# Patient Record
Sex: MALE | Employment: OTHER | URBAN - METROPOLITAN AREA
[De-identification: names, ages, dates, MRNs, and addresses within clinical notes are randomized per-mention and may not be internally consistent; named-entity substitution may affect disease eponyms.]

---

## 2020-05-20 ENCOUNTER — OFFICE VISIT (OUTPATIENT)
Dept: PULMONOLOGY | Facility: CLINIC | Age: 70
End: 2020-05-20
Payer: MEDICARE

## 2020-05-20 VITALS
BODY MASS INDEX: 21.6 KG/M2 | DIASTOLIC BLOOD PRESSURE: 72 MMHG | HEART RATE: 83 BPM | OXYGEN SATURATION: 90 % | WEIGHT: 142.5 LBS | HEIGHT: 68 IN | SYSTOLIC BLOOD PRESSURE: 120 MMHG | RESPIRATION RATE: 20 BRPM

## 2020-05-20 DIAGNOSIS — J43.1 PANLOBULAR EMPHYSEMA: Primary | ICD-10-CM

## 2020-05-20 DIAGNOSIS — Z77.090 H/O ASBESTOS EXPOSURE: ICD-10-CM

## 2020-05-20 DIAGNOSIS — R09.02 EXERCISE HYPOXEMIA: ICD-10-CM

## 2020-05-20 DIAGNOSIS — Z77.090 ASBESTOS EXPOSURE: ICD-10-CM

## 2020-05-20 DIAGNOSIS — R06.09 DYSPNEA ON EXERTION: ICD-10-CM

## 2020-05-20 DIAGNOSIS — J44.89 ASTHMA WITH COPD: ICD-10-CM

## 2020-05-20 DIAGNOSIS — J44.1 COPD EXACERBATION: ICD-10-CM

## 2020-05-20 PROCEDURE — 99205 OFFICE O/P NEW HI 60 MIN: CPT | Mod: S$GLB,,, | Performed by: INTERNAL MEDICINE

## 2020-05-20 PROCEDURE — 99999 PR PBB SHADOW E&M-NEW PATIENT-LVL IV: ICD-10-PCS | Mod: PBBFAC,,, | Performed by: INTERNAL MEDICINE

## 2020-05-20 PROCEDURE — 99205 PR OFFICE/OUTPT VISIT, NEW, LEVL V, 60-74 MIN: ICD-10-PCS | Mod: S$GLB,,, | Performed by: INTERNAL MEDICINE

## 2020-05-20 PROCEDURE — 1101F PR PT FALLS ASSESS DOC 0-1 FALLS W/OUT INJ PAST YR: ICD-10-PCS | Mod: CPTII,S$GLB,, | Performed by: INTERNAL MEDICINE

## 2020-05-20 PROCEDURE — 1159F PR MEDICATION LIST DOCUMENTED IN MEDICAL RECORD: ICD-10-PCS | Mod: S$GLB,,, | Performed by: INTERNAL MEDICINE

## 2020-05-20 PROCEDURE — 1101F PT FALLS ASSESS-DOCD LE1/YR: CPT | Mod: CPTII,S$GLB,, | Performed by: INTERNAL MEDICINE

## 2020-05-20 PROCEDURE — 99999 PR PBB SHADOW E&M-NEW PATIENT-LVL IV: CPT | Mod: PBBFAC,,, | Performed by: INTERNAL MEDICINE

## 2020-05-20 PROCEDURE — 1159F MED LIST DOCD IN RCRD: CPT | Mod: S$GLB,,, | Performed by: INTERNAL MEDICINE

## 2020-05-20 RX ORDER — IPRATROPIUM BROMIDE AND ALBUTEROL SULFATE 2.5; .5 MG/3ML; MG/3ML
SOLUTION RESPIRATORY (INHALATION)
COMMUNITY
Start: 2020-05-18 | End: 2020-05-20

## 2020-05-20 RX ORDER — FLUTICASONE PROPIONATE AND SALMETEROL 250; 50 UG/1; UG/1
1 POWDER RESPIRATORY (INHALATION) 2 TIMES DAILY
Qty: 1 EACH | Refills: 11 | Status: SHIPPED | OUTPATIENT
Start: 2020-05-20 | End: 2020-07-24

## 2020-05-20 RX ORDER — GUAIFENESIN 600 MG/1
1200 TABLET, EXTENDED RELEASE ORAL 2 TIMES DAILY
Qty: 60 TABLET | Refills: 11 | Status: SHIPPED | OUTPATIENT
Start: 2020-05-20 | End: 2020-06-19

## 2020-05-20 RX ORDER — PREDNISONE 20 MG/1
TABLET ORAL
Qty: 20 TABLET | Refills: 1 | Status: SHIPPED | OUTPATIENT
Start: 2020-05-20

## 2020-05-20 RX ORDER — AZITHROMYCIN 250 MG/1
250 TABLET, FILM COATED ORAL DAILY
Qty: 6 TABLET | Refills: 1 | Status: SHIPPED | OUTPATIENT
Start: 2020-05-20

## 2020-05-20 RX ORDER — IPRATROPIUM BROMIDE AND ALBUTEROL SULFATE 2.5; .5 MG/3ML; MG/3ML
3 SOLUTION RESPIRATORY (INHALATION)
Qty: 120 VIAL | Refills: 11 | Status: SHIPPED | OUTPATIENT
Start: 2020-05-20

## 2020-05-20 NOTE — PROGRESS NOTES
Subjective:     Patient ID: Александр Roman is a 69 y.o. male.    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 The patient is not currently have symptoms / an exacerbation. The patient has COPD for approximately 1 years. Symptoms in previous episodes have included dyspnea, cough and wheezing, and typically last 2 weeks. Previous episodes have been exacerbated by strenuous activity. Current treatment includes albuterol inhaler, which generally provides some relief of symptoms.   He uses 1 pillows at night. Patient currently is not on home oxygen therapy.. The patient is having no constitutional symptoms, denying fever, chills, anorexia, or weight loss. The patient has not been hospitalized for this condition before. He has a history of 10 pack years. The patient has no exercise limitations.    Past Medical History:   Diagnosis Date    Arthritis     Emphysema of lung     Hypertension      Past Surgical History:   Procedure Laterality Date    HIP SURGERY Left     KNEE ARTHROSCOPY Left      Review of patient's allergies indicates:  No Known Allergies  Current Outpatient Medications on File Prior to Visit   Medication Sig Dispense Refill    albuterol 90 mcg/actuation inhaler Inhale 2 puffs into the lungs every 6 (six) hours. 1 Inhaler 12    hydrochlorothiazide (MICROZIDE) 12.5 mg capsule Take 12.5 mg by mouth once daily.      meloxicam (MOBIC) 15 MG tablet Take 15 mg by mouth once daily.      oxycodone-acetaminophen (PERCOCET)  mg per tablet Take 1 tablet by mouth every 6 (six) hours  as needed for Pain.      [DISCONTINUED] albuterol-ipratropium (DUO-NEB) 2.5 mg-0.5 mg/3 mL nebulizer solution        No current facility-administered medications on file prior to visit.      Social History     Socioeconomic History    Marital status: Single     Spouse name: Not on file    Number of children: Not on file    Years of education: Not on file    Highest education level: Not on file   Occupational History    Not on file   Social Needs    Financial resource strain: Not on file    Food insecurity:     Worry: Not on file     Inability: Not on file    Transportation needs:     Medical: Not on file     Non-medical: Not on file   Tobacco Use    Smoking status: Former Smoker    Smokeless tobacco: Former User     Quit date: 4/1/2014   Substance and Sexual Activity    Alcohol use: Never     Frequency: Never    Drug use: Not on file    Sexual activity: Not on file   Lifestyle    Physical activity:     Days per week: Not on file     Minutes per session: Not on file    Stress: Not on file   Relationships    Social connections:     Talks on phone: Not on file     Gets together: Not on file     Attends Shinto service: Not on file     Active member of club or organization: Not on file     Attends meetings of clubs or organizations: Not on file     Relationship status: Not on file   Other Topics Concern    Not on file   Social History Narrative    Not on file     History reviewed. No pertinent family history.    Review of Systems   Constitutional: Positive for fatigue. Negative for fever.   HENT: Positive for postnasal drip, rhinorrhea and congestion.    Eyes: Negative for redness and itching.   Respiratory: Positive for cough, sputum production, shortness of breath, dyspnea on extertion, use of rescue inhaler and Paroxysmal Nocturnal Dyspnea.    Cardiovascular: Negative for chest pain, palpitations and leg swelling.   Genitourinary: Negative for difficulty urinating and hematuria.   Endocrine:  "Negative for cold intolerance and heat intolerance.    Skin: Negative for rash.   Gastrointestinal: Negative for nausea and abdominal pain.   Neurological: Negative for dizziness, syncope, weakness and light-headedness.   Hematological: Negative for adenopathy. Does not bruise/bleed easily.   Psychiatric/Behavioral: Negative for sleep disturbance. The patient is not nervous/anxious.        Objective:      /72   Pulse 83   Resp 20   Ht 5' 8" (1.727 m)   Wt 64.7 kg (142 lb 8.4 oz)   SpO2 (!) 90%   BMI 21.67 kg/m²   Physical Exam   Constitutional: He is oriented to person, place, and time. He appears well-developed and well-nourished.   HENT:   Head: Normocephalic and atraumatic.   Mouth/Throat: Oropharyngeal exudate present.   Eyes: Pupils are equal, round, and reactive to light. Conjunctivae are normal.   Neck: Neck supple. No JVD present. No tracheal deviation present. No thyromegaly present.   Cardiovascular: Normal rate, regular rhythm and normal heart sounds.   No murmur heard.  Pulmonary/Chest: Effort normal. He has decreased breath sounds. He has wheezes in the right lower field and the left lower field. He has no rhonchi. He has no rales.   Abdominal: Soft. Bowel sounds are normal.   Musculoskeletal: Normal range of motion. He exhibits no edema or tenderness.   Lymphadenopathy:     He has no cervical adenopathy.   Neurological: He is alert and oriented to person, place, and time.   Skin: Skin is warm and dry.   Nursing note and vitals reviewed.    Personal Diagnostic Review  none pertinent  No image results found.      Office Spirometry Results:     No flowsheet data found.  Pulmonary Studies Review 5/20/2020   SpO2 90   Height 68.000   Weight 2280.44   BMI (Calculated) 21.7   Predicted Distance 386.4   Predicted Distance Meters (Calculated) 538.18         Assessment:            Panlobular emphysema  -     CT Chest Without Contrast; Future; Expected date: 05/20/2020  -     Stress test, pulmonary; " Future; Expected date: 05/20/2020    Asbestos exposure  -     CT Chest Without Contrast; Future; Expected date: 05/20/2020    Dyspnea on exertion  -     CT Chest Without Contrast; Future; Expected date: 05/20/2020  -     Stress test, pulmonary; Future; Expected date: 05/20/2020    Asthma with COPD  -     albuterol-ipratropium (DUO-NEB) 2.5 mg-0.5 mg/3 mL nebulizer solution; Take 3 mLs by nebulization every 6 (six) hours while awake.  Dispense: 120 vial; Refill: 11  -     guaiFENesin (MUCINEX) 600 mg 12 hr tablet; Take 2 tablets (1,200 mg total) by mouth 2 (two) times daily.  Dispense: 60 tablet; Refill: 11  -     fluticasone-salmeterol diskus inhaler 250-50 mcg; Inhale 1 puff into the lungs 2 (two) times daily. Wash out mouth after use.  Dispense: 1 each; Refill: 11  -     Stress test, pulmonary; Future; Expected date: 05/20/2020    COPD exacerbation  -     azithromycin (ZITHROMAX Z-MARJORIE) 250 MG tablet; Take 1 tablet (250 mg total) by mouth once daily. 500 mg on day 1 (two tablets) followed by 250 mg once daily on days 2-5  Dispense: 6 tablet; Refill: 1  -     predniSONE (DELTASONE) 20 MG tablet; Prednisone 60 mg/ day for 3 days, 40 mg/day for 3 days,20 mg/ day for 3 days, (1/2 tablet )10 mg a day for 3 days.  Dispense: 20 tablet; Refill: 1    Exercise hypoxemia  -     Stress test, pulmonary; Future; Expected date: 05/20/2020    H/O asbestos exposure          Outpatient Encounter Medications as of 5/20/2020   Medication Sig Dispense Refill    albuterol 90 mcg/actuation inhaler Inhale 2 puffs into the lungs every 6 (six) hours. 1 Inhaler 12    albuterol-ipratropium (DUO-NEB) 2.5 mg-0.5 mg/3 mL nebulizer solution Take 3 mLs by nebulization every 6 (six) hours while awake. 120 vial 11    hydrochlorothiazide (MICROZIDE) 12.5 mg capsule Take 12.5 mg by mouth once daily.      meloxicam (MOBIC) 15 MG tablet Take 15 mg by mouth once daily.      oxycodone-acetaminophen (PERCOCET)  mg per tablet Take 1 tablet by  mouth every 6 (six) hours as needed for Pain.      [DISCONTINUED] albuterol-ipratropium (DUO-NEB) 2.5 mg-0.5 mg/3 mL nebulizer solution       azithromycin (ZITHROMAX Z-MARJORIE) 250 MG tablet Take 1 tablet (250 mg total) by mouth once daily. 500 mg on day 1 (two tablets) followed by 250 mg once daily on days 2-5 6 tablet 1    fluticasone-salmeterol diskus inhaler 250-50 mcg Inhale 1 puff into the lungs 2 (two) times daily. Wash out mouth after use. 1 each 11    guaiFENesin (MUCINEX) 600 mg 12 hr tablet Take 2 tablets (1,200 mg total) by mouth 2 (two) times daily. 60 tablet 11    predniSONE (DELTASONE) 20 MG tablet Prednisone 60 mg/ day for 3 days, 40 mg/day for 3 days,20 mg/ day for 3 days, (1/2 tablet )10 mg a day for 3 days. 20 tablet 1     No facility-administered encounter medications on file as of 5/20/2020.      Plan:       Requested Prescriptions     Signed Prescriptions Disp Refills    albuterol-ipratropium (DUO-NEB) 2.5 mg-0.5 mg/3 mL nebulizer solution 120 vial 11     Sig: Take 3 mLs by nebulization every 6 (six) hours while awake.    guaiFENesin (MUCINEX) 600 mg 12 hr tablet 60 tablet 11     Sig: Take 2 tablets (1,200 mg total) by mouth 2 (two) times daily.    azithromycin (ZITHROMAX Z-MARJORIE) 250 MG tablet 6 tablet 1     Sig: Take 1 tablet (250 mg total) by mouth once daily. 500 mg on day 1 (two tablets) followed by 250 mg once daily on days 2-5    predniSONE (DELTASONE) 20 MG tablet 20 tablet 1     Sig: Prednisone 60 mg/ day for 3 days, 40 mg/day for 3 days,20 mg/ day for 3 days, (1/2 tablet )10 mg a day for 3 days.    fluticasone-salmeterol diskus inhaler 250-50 mcg 1 each 11     Sig: Inhale 1 puff into the lungs 2 (two) times daily. Wash out mouth after use.     Problem List Items Addressed This Visit     COPD (chronic obstructive pulmonary disease) - Primary    Relevant Orders    CT Chest Without Contrast    Stress test, pulmonary    H/O asbestos exposure      Other Visit Diagnoses     Asbestos  exposure        Relevant Orders    CT Chest Without Contrast    Dyspnea on exertion        Relevant Orders    CT Chest Without Contrast    Stress test, pulmonary    Asthma with COPD        Relevant Medications    albuterol-ipratropium (DUO-NEB) 2.5 mg-0.5 mg/3 mL nebulizer solution    guaiFENesin (MUCINEX) 600 mg 12 hr tablet    fluticasone-salmeterol diskus inhaler 250-50 mcg    Other Relevant Orders    Stress test, pulmonary    COPD exacerbation        Relevant Medications    azithromycin (ZITHROMAX Z-MARJORIE) 250 MG tablet    predniSONE (DELTASONE) 20 MG tablet    Exercise hypoxemia        Relevant Orders    Stress test, pulmonary             Follow up in about 4 weeks (around 6/17/2020) for 6 min walk - on return, CT/PET - ASAP.    MEDICAL DECISION MAKING: Moderate to high complexity.  Overall, the multiple problems listed are of moderate to high severity that may impact quality of life and activities of daily living. Side effects of medications, treatment plan as well as options and alternatives reviewed and discussed with patient. There was counseling of patient concerning these issues.    Total time spent in face to face counseling and coordination of care - 60  minutes over 50% of time was used in discussion of prognosis, risks, benefits of treatment, instructions and compliance with regimen . Discussion with other physicians or health care providers (DME, NP, pharmacy, respiratory therapy) occurred.

## 2020-05-23 ENCOUNTER — HOSPITAL ENCOUNTER (OUTPATIENT)
Dept: RADIOLOGY | Facility: HOSPITAL | Age: 70
Discharge: HOME | End: 2020-05-23
Attending: INTERNAL MEDICINE
Payer: MEDICARE

## 2020-05-23 DIAGNOSIS — Z77.090 ASBESTOS EXPOSURE: ICD-10-CM

## 2020-05-23 DIAGNOSIS — J43.1 PANLOBULAR EMPHYSEMA: ICD-10-CM

## 2020-05-23 DIAGNOSIS — R06.09 DYSPNEA ON EXERTION: ICD-10-CM

## 2020-05-23 PROCEDURE — 71250 CT THORAX DX C-: CPT | Mod: TC

## 2020-06-05 ENCOUNTER — TELEPHONE (OUTPATIENT)
Dept: PULMONOLOGY | Facility: CLINIC | Age: 70
End: 2020-06-05

## 2020-06-05 NOTE — TELEPHONE ENCOUNTER
----- Message from Bladimir Roe sent at 6/5/2020  3:54 PM CDT -----  Contact: pt  Pt Александр Roman    Pt missed a call from your office     Pt can be reached at [phone]

## 2020-06-08 ENCOUNTER — TELEPHONE (OUTPATIENT)
Dept: PULMONOLOGY | Facility: CLINIC | Age: 70
End: 2020-06-08

## 2020-06-08 NOTE — TELEPHONE ENCOUNTER
----- Message from Weston Rosas sent at 6/8/2020  2:29 PM CDT -----  Contact: Self- [phone]  Pt would like a call from nurse .please call back at [phone].       Thank You,   Weston Rosas

## 2020-07-24 ENCOUNTER — OFFICE VISIT (OUTPATIENT)
Dept: PULMONOLOGY | Facility: CLINIC | Age: 70
End: 2020-07-24
Payer: MEDICARE

## 2020-07-24 ENCOUNTER — CLINICAL SUPPORT (OUTPATIENT)
Dept: PULMONOLOGY | Facility: CLINIC | Age: 70
End: 2020-07-24
Payer: MEDICARE

## 2020-07-24 VITALS
RESPIRATION RATE: 16 BRPM | DIASTOLIC BLOOD PRESSURE: 95 MMHG | SYSTOLIC BLOOD PRESSURE: 131 MMHG | HEIGHT: 68 IN | HEIGHT: 68 IN | BODY MASS INDEX: 22.45 KG/M2 | WEIGHT: 148.13 LBS | HEART RATE: 60 BPM | WEIGHT: 148 LBS | OXYGEN SATURATION: 97 % | BODY MASS INDEX: 22.43 KG/M2

## 2020-07-24 DIAGNOSIS — J43.1 PANLOBULAR EMPHYSEMA: ICD-10-CM

## 2020-07-24 DIAGNOSIS — R06.09 DYSPNEA ON EXERTION: ICD-10-CM

## 2020-07-24 DIAGNOSIS — J44.89 ASTHMA WITH COPD: ICD-10-CM

## 2020-07-24 DIAGNOSIS — J43.2 CENTRILOBULAR EMPHYSEMA: Primary | ICD-10-CM

## 2020-07-24 DIAGNOSIS — Z77.090 H/O ASBESTOS EXPOSURE: ICD-10-CM

## 2020-07-24 DIAGNOSIS — J30.89 NON-SEASONAL ALLERGIC RHINITIS DUE TO OTHER ALLERGIC TRIGGER: ICD-10-CM

## 2020-07-24 DIAGNOSIS — R09.02 EXERCISE HYPOXEMIA: ICD-10-CM

## 2020-07-24 DIAGNOSIS — J43.9 LUNG BULLAE: ICD-10-CM

## 2020-07-24 PROCEDURE — 1101F PT FALLS ASSESS-DOCD LE1/YR: CPT | Mod: CPTII,S$GLB,, | Performed by: NURSE PRACTITIONER

## 2020-07-24 PROCEDURE — 1159F PR MEDICATION LIST DOCUMENTED IN MEDICAL RECORD: ICD-10-PCS | Mod: S$GLB,,, | Performed by: NURSE PRACTITIONER

## 2020-07-24 PROCEDURE — 99214 PR OFFICE/OUTPT VISIT, EST, LEVL IV, 30-39 MIN: ICD-10-PCS | Mod: 25,S$GLB,, | Performed by: NURSE PRACTITIONER

## 2020-07-24 PROCEDURE — 94618 PULMONARY STRESS TESTING: ICD-10-PCS | Mod: S$GLB,,, | Performed by: INTERNAL MEDICINE

## 2020-07-24 PROCEDURE — 99214 OFFICE O/P EST MOD 30 MIN: CPT | Mod: 25,S$GLB,, | Performed by: NURSE PRACTITIONER

## 2020-07-24 PROCEDURE — 1159F MED LIST DOCD IN RCRD: CPT | Mod: S$GLB,,, | Performed by: NURSE PRACTITIONER

## 2020-07-24 PROCEDURE — 94618 PULMONARY STRESS TESTING: CPT | Mod: S$GLB,,, | Performed by: INTERNAL MEDICINE

## 2020-07-24 PROCEDURE — 99999 PR PBB SHADOW E&M-EST. PATIENT-LVL III: CPT | Mod: PBBFAC,,, | Performed by: NURSE PRACTITIONER

## 2020-07-24 PROCEDURE — 1101F PR PT FALLS ASSESS DOC 0-1 FALLS W/OUT INJ PAST YR: ICD-10-PCS | Mod: CPTII,S$GLB,, | Performed by: NURSE PRACTITIONER

## 2020-07-24 PROCEDURE — 99999 PR PBB SHADOW E&M-EST. PATIENT-LVL III: ICD-10-PCS | Mod: PBBFAC,,, | Performed by: NURSE PRACTITIONER

## 2020-07-24 PROCEDURE — 3008F PR BODY MASS INDEX (BMI) DOCUMENTED: ICD-10-PCS | Mod: CPTII,S$GLB,, | Performed by: NURSE PRACTITIONER

## 2020-07-24 PROCEDURE — 3008F BODY MASS INDEX DOCD: CPT | Mod: CPTII,S$GLB,, | Performed by: NURSE PRACTITIONER

## 2020-07-24 RX ORDER — LEVOCETIRIZINE DIHYDROCHLORIDE 5 MG/1
5 TABLET, FILM COATED ORAL NIGHTLY
Qty: 90 TABLET | Refills: 4 | Status: SHIPPED | OUTPATIENT
Start: 2020-07-24 | End: 2021-07-24

## 2020-07-24 RX ORDER — UMECLIDINIUM BROMIDE AND VILANTEROL TRIFENATATE 62.5; 25 UG/1; UG/1
62.5 POWDER RESPIRATORY (INHALATION) DAILY
Qty: 60 EACH | Refills: 11 | Status: SHIPPED | OUTPATIENT
Start: 2020-07-24

## 2020-07-24 RX ORDER — FLUTICASONE PROPIONATE 50 MCG
2 SPRAY, SUSPENSION (ML) NASAL DAILY
Qty: 16 G | Refills: 11 | Status: SHIPPED | OUTPATIENT
Start: 2020-07-24 | End: 2021-07-24

## 2020-07-24 NOTE — PROGRESS NOTES
Subjective:     Patient ID: Александр Roman is a 70 y.o. male.    Chief Complaint   Patient presents with    COPD    Hx Asbestos exposure    Emphysema     HPI   Александр Roman 70 y.o. presents for follow up on COPD and shortness of breath with review of 6MWD and CT chest 5/23/2020 revealed extensive centrilobular emphysema, COPD, and bullae formation. mild cardiomegaly.  No asbestos.  No abnormal pulmonary mass. no pulmonary nodule seen.  No lymphadenopathy.     7/24/2020 6MWD No desaturations requiring supplemental oxygen at rest or exertion.    Current treatment regime Advair 250 inhaler patient did not find benefit so he has stopped using.  He continues DuoNeb treatments 2-3 times daily.  Currently using albuterol inhaler about 4-5 times daily uses it for symptoms of sensation of chest congestion and shortness of breath.    Patient reports he has history of anxiety with panic attacks and will urinate on himself when he has panic attacks. He is followed by his primary care provider, Dr. Cuong Zazueta.     He saw Dr. Winslow May 20, 2020 related to concerned about asbestos exposure.   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   The patient is not currently have symptoms / an exacerbation.   The patient has COPD for approximately 1 years. Symptoms in previous episodes have included dyspnea, cough and wheezing, and typically last 2 weeks. Previous episodes have been exacerbated by strenuous activity. Current treatment includes albuterol inhaler, which generally provides some relief of symptoms.   He uses 1 pillows at night.   Patient currently is not on  home oxygen therapy.. The patient is having no constitutional symptoms, denying fever, chills, anorexia, or weight loss. The patient has not been hospitalized for this condition before. He has a history of 10 pack years. The patient has no exercise limitations.    Past Medical History:   Diagnosis Date    Arthritis     Emphysema of lung     Hypertension      Past Surgical History:   Procedure Laterality Date    HIP SURGERY Left     KNEE ARTHROSCOPY Left      Review of patient's allergies indicates:  No Known Allergies  Current Outpatient Medications on File Prior to Visit   Medication Sig Dispense Refill    albuterol 90 mcg/actuation inhaler Inhale 2 puffs into the lungs every 6 (six) hours. 1 Inhaler 12    albuterol-ipratropium (DUO-NEB) 2.5 mg-0.5 mg/3 mL nebulizer solution Take 3 mLs by nebulization every 6 (six) hours while awake. 120 vial 11    azithromycin (ZITHROMAX Z-MARJORIE) 250 MG tablet Take 1 tablet (250 mg total) by mouth once daily. 500 mg on day 1 (two tablets) followed by 250 mg once daily on days 2-5 (Patient not taking: Reported on 7/24/2020) 6 tablet 1    hydrochlorothiazide (MICROZIDE) 12.5 mg capsule Take 12.5 mg by mouth once daily.      meloxicam (MOBIC) 15 MG tablet Take 15 mg by mouth once daily.      oxycodone-acetaminophen (PERCOCET)  mg per tablet Take 1 tablet by mouth every 6 (six) hours as needed for Pain.      predniSONE (DELTASONE) 20 MG tablet Prednisone 60 mg/ day for 3 days, 40 mg/day for 3 days,20 mg/ day for 3 days, (1/2 tablet )10 mg a day for 3 days. (Patient not taking: Reported on 7/24/2020) 20 tablet 1    [DISCONTINUED] fluticasone-salmeterol diskus inhaler 250-50 mcg Inhale 1 puff into the lungs 2 (two) times daily. Wash out mouth after use. 1 each 11     No current facility-administered medications on file prior to visit.      Social History     Socioeconomic History    Marital status: Single     Spouse name: Not on file    Number of children: Not on file     Years of education: Not on file    Highest education level: Not on file   Occupational History    Not on file   Social Needs    Financial resource strain: Not on file    Food insecurity     Worry: Not on file     Inability: Not on file    Transportation needs     Medical: Not on file     Non-medical: Not on file   Tobacco Use    Smoking status: Former Smoker     Packs/day: 1.00     Years: 28.00     Pack years: 28.00     Types: Cigarettes     Start date:      Quit date:      Years since quittin.5    Smokeless tobacco: Former User     Quit date: 2014   Substance and Sexual Activity    Alcohol use: Never     Frequency: Never    Drug use: Not on file    Sexual activity: Not on file   Lifestyle    Physical activity     Days per week: Not on file     Minutes per session: Not on file    Stress: Not on file   Relationships    Social connections     Talks on phone: Not on file     Gets together: Not on file     Attends Mosque service: Not on file     Active member of club or organization: Not on file     Attends meetings of clubs or organizations: Not on file     Relationship status: Not on file   Other Topics Concern    Not on file   Social History Narrative    Not on file     History reviewed. No pertinent family history.    Review of Systems   Constitutional: Positive for fatigue. Negative for fever.   HENT: Positive for postnasal drip, rhinorrhea and congestion.    Eyes: Negative for redness and itching.   Respiratory: Positive for cough, sputum production, shortness of breath, dyspnea on extertion, use of rescue inhaler and Paroxysmal Nocturnal Dyspnea.    Cardiovascular: Negative for chest pain, palpitations and leg swelling.   Genitourinary: Negative for difficulty urinating and hematuria.   Endocrine: Negative for cold intolerance and heat intolerance.    Skin: Negative for rash.   Gastrointestinal: Negative for nausea and abdominal pain.   Neurological: Negative for dizziness,  "syncope, weakness and light-headedness.   Hematological: Negative for adenopathy. Does not bruise/bleed easily.   Psychiatric/Behavioral: Negative for sleep disturbance. The patient is not nervous/anxious.        Objective:      BP (!) 131/95   Pulse 60   Resp 16   Ht 5' 8" (1.727 m)   Wt 67.1 kg (148 lb)   SpO2 97%   BMI 22.50 kg/m²   Physical Exam  Vitals signs and nursing note reviewed.   Constitutional:       Appearance: He is well-developed.   HENT:      Head: Normocephalic and atraumatic.      Mouth/Throat:      Pharynx: Oropharyngeal exudate present.   Eyes:      Conjunctiva/sclera: Conjunctivae normal.      Pupils: Pupils are equal, round, and reactive to light.   Neck:      Musculoskeletal: Neck supple.      Thyroid: No thyromegaly.      Vascular: No JVD.      Trachea: No tracheal deviation.   Cardiovascular:      Rate and Rhythm: Normal rate and regular rhythm.      Heart sounds: Normal heart sounds. No murmur.   Pulmonary:      Effort: Pulmonary effort is normal.      Breath sounds: Examination of the right-lower field reveals wheezing. Examination of the left-lower field reveals wheezing. Decreased breath sounds and wheezing present. No rhonchi or rales.   Abdominal:      General: Bowel sounds are normal.      Palpations: Abdomen is soft.   Musculoskeletal: Normal range of motion.         General: No tenderness.   Lymphadenopathy:      Cervical: No cervical adenopathy.   Skin:     General: Skin is warm and dry.   Neurological:      Mental Status: He is alert and oriented to person, place, and time.       Personal Diagnostic Review  none pertinent  CT Chest Without Contrast  Narrative: EXAMINATION:  CT CHEST WITHOUT CONTRAST    CLINICAL HISTORY:  Contact with and (suspected) exposure to asbestosDyspnea chronic, prior xray;Asbestos exposure, interstitial lung dz suspected;    TECHNIQUE:  Axial images through the chest were obtained without the use of IV contrast. All CT scans at this facility use " dose modulation, iterative reconstruction, and/or weight based dosing when appropriate to reduce radiation dose to as low as reasonably achievable.    COMPARISON:  No prior chest CT available    FINDINGS:  Extensive centrilobular emphysematous changes present.  Multiple Kline I are noted, most prominent in the right lower lobe.  Right apical pleural blebs are noted.  There is mild patchy scarring and/or subsegmental atelectasis in the lung bases and lingula.  No acute appearing infiltrate, pleural effusion, pneumothorax, suspicious nodule or lung mass identified.  No evidence of calcified pleural plaques, pleural mass or asbestos related pleuroparenchymal disease.  The airways are patent.  No pathologic lymphadenopathy is seen in the chest.  There are atherosclerotic calcifications of the aorta and coronary arteries.  There is mild cardiomegaly.  There is no significant pericardial effusion. No aortic aneurysm identified. Negative for acute fracture or suspicious osseous lesions. No acute disease seen in the upper abdomen.  There is a small nonobstructing left renal stone.  Impression: No acute abnormality identified in the chest.  No evidence of asbestos related pleural disease.  COPD.  Cardiomegaly.  Coronary artery calcifications.    Electronically signed by: Ralph Soni MD  Date:    05/23/2020  Time:    09:22      Office Spirometry Results:     No flowsheet data found.  Pulmonary Studies Review 7/24/2020   SpO2 97   Ordering Provider -   Interpreting Provider -   Performing nurse/tech/RT -   Diagnosis -   Height 68   Weight 2368   BMI (Calculated) 22.5   Predicted Distance 374.98   Patient Race -   6MWT Status -   Patient Reported -   Was O2 used? -   6MW Distance walked (feet) -   Distance walked (meters) -   Did patient stop? -   How many times? -   Stop Time 1 -   Restart Time 1 -   Did patient restart? -   Type of assistive device(s) used? -   Is extra documentation required for this patient? -   Oxygen  Saturation -   Supplemental Oxygen -   Heart Rate -   Blood Pressure -   Shin Dyspnea Rating  -   Oxygen Saturation -   Supplemental Oxygen -   Heart Rate -   Blood Pressure -   Shin Dyspnea Rating  -   Recovery Time (seconds) -   Oxygen Saturation -   Supplemental Oxygen -   Heart Rate -   Blood Pressure -   Shin Dyspnea Rating  -   Is procedure ready for interpretation? -   Did the patient stop or pause? -   How many times did the patient stop or pause? -   Stop Time 1 -   Restart Time 1 -   Pause Time 1 -   Total Time Walked (Calculated) -   Total Laps Walked -   Final Partial Lap Distance (feet) -   Total Distance Feet (Calculated) -   Total Distance Meters (Calculated) -   Predicted Distance Meters (Calculated) 528.79   Percentage of Predicted (Calculated) -   Peak VO2 (Calculated) -   Mets -   Has The Patient Had a Previous Six Minute Walk Test? -   Oxygen Qualification? -         Assessment:       Lung bullae  Seen on CT chest May 2020    Centrilobular emphysema  Suboptimal control on Advair patient not using  Begin trial of Anoro Laba/Lama  Consideration to add on Pulmicort nebs or ics controller depending on results of Anoro alone, Trelegy Ellipta may also be considered if covered by his people's Health insurance.   Continue albuterol inhaler and duo nebs if needed.  Follow-up for complete PFT  CT chest May 2020 revealed diffuse centrilobular emphysema and Bullae of lungs.  No pulmonary nodules. no lung mass. no asbestos findings.    H/O asbestos exposure  CT chest May 2020 revealed diffuse centrilobular emphysema and Bullae of lungs.  No pulmonary nodules. no lung mass. no asbestos findings.      Centrilobular emphysema  -     umeclidinium-vilanteroL (ANORO ELLIPTA) 62.5-25 mcg/actuation DsDv; Inhale 1 puff into the lungs once daily. Controller  Dispense: 60 each; Refill: 11  -     Complete PFT with bronchodilator; Future  -     COVID-19 Routine Screening; Future; Expected date: 07/24/2020    Non-seasonal  allergic rhinitis due to other allergic trigger  -     fluticasone propionate (FLONASE) 50 mcg/actuation nasal spray; 2 sprays (100 mcg total) by Each Nostril route once daily.  Dispense: 16 g; Refill: 11  -     levocetirizine (XYZAL) 5 MG tablet; Take 1 tablet (5 mg total) by mouth every evening.  Dispense: 90 tablet; Refill: 4    Lung bullae    H/O asbestos exposure          Outpatient Encounter Medications as of 7/24/2020   Medication Sig Dispense Refill    albuterol 90 mcg/actuation inhaler Inhale 2 puffs into the lungs every 6 (six) hours. 1 Inhaler 12    albuterol-ipratropium (DUO-NEB) 2.5 mg-0.5 mg/3 mL nebulizer solution Take 3 mLs by nebulization every 6 (six) hours while awake. 120 vial 11    azithromycin (ZITHROMAX Z-MARJORIE) 250 MG tablet Take 1 tablet (250 mg total) by mouth once daily. 500 mg on day 1 (two tablets) followed by 250 mg once daily on days 2-5 (Patient not taking: Reported on 7/24/2020) 6 tablet 1    fluticasone propionate (FLONASE) 50 mcg/actuation nasal spray 2 sprays (100 mcg total) by Each Nostril route once daily. 16 g 11    hydrochlorothiazide (MICROZIDE) 12.5 mg capsule Take 12.5 mg by mouth once daily.      levocetirizine (XYZAL) 5 MG tablet Take 1 tablet (5 mg total) by mouth every evening. 90 tablet 4    meloxicam (MOBIC) 15 MG tablet Take 15 mg by mouth once daily.      oxycodone-acetaminophen (PERCOCET)  mg per tablet Take 1 tablet by mouth every 6 (six) hours as needed for Pain.      predniSONE (DELTASONE) 20 MG tablet Prednisone 60 mg/ day for 3 days, 40 mg/day for 3 days,20 mg/ day for 3 days, (1/2 tablet )10 mg a day for 3 days. (Patient not taking: Reported on 7/24/2020) 20 tablet 1    umeclidinium-vilanteroL (ANORO ELLIPTA) 62.5-25 mcg/actuation DsDv Inhale 1 puff into the lungs once daily. Controller 60 each 11    [DISCONTINUED] fluticasone-salmeterol diskus inhaler 250-50 mcg Inhale 1 puff into the lungs 2 (two) times daily. Wash out mouth after use. 1 each  11     No facility-administered encounter medications on file as of 2020.      Plan:       Requested Prescriptions     Signed Prescriptions Disp Refills    fluticasone propionate (FLONASE) 50 mcg/actuation nasal spray 16 g 11     Si sprays (100 mcg total) by Each Nostril route once daily.    umeclidinium-vilanteroL (ANORO ELLIPTA) 62.5-25 mcg/actuation DsDv 60 each 11     Sig: Inhale 1 puff into the lungs once daily. Controller    levocetirizine (XYZAL) 5 MG tablet 90 tablet 4     Sig: Take 1 tablet (5 mg total) by mouth every evening.     Problem List Items Addressed This Visit     Lung bullae    Current Assessment & Plan     Seen on CT chest May 2020         H/O asbestos exposure    Overview     CT chest May 2020 revealed diffuse centrilobular emphysema and Bullae of lungs.  No pulmonary nodules. no lung mass. no asbestos findings.           Current Assessment & Plan     CT chest May 2020 revealed diffuse centrilobular emphysema and Bullae of lungs.  No pulmonary nodules. no lung mass. no asbestos findings.           Centrilobular emphysema - Primary (Chronic)    Current Assessment & Plan     Suboptimal control on Advair patient not using  Begin trial of Anoro Laba/Lama  Consideration to add on Pulmicort nebs or ics controller depending on results of Anoro alone, Trelegy Ellipta may also be considered if covered by his people's Health insurance.   Continue albuterol inhaler and duo nebs if needed.  Follow-up for complete PFT  CT chest May 2020 revealed diffuse centrilobular emphysema and Bullae of lungs.  No pulmonary nodules. no lung mass. no asbestos findings.         Relevant Medications    umeclidinium-vilanteroL (ANORO ELLIPTA) 62.5-25 mcg/actuation DsDv    Other Relevant Orders    Complete PFT with bronchodilator    COVID-19 Routine Screening      Other Visit Diagnoses     Non-seasonal allergic rhinitis due to other allergic trigger        Relevant Medications    fluticasone propionate (FLONASE)  50 mcg/actuation nasal spray    levocetirizine (XYZAL) 5 MG tablet        Minimal benefit with advair, has not been using  Trial of anoro LABA/LAMA consideration to add on pulmicort nebs if not stable with anoro alone.  Patient also feels he is having upper respiratory allergy symptoms currently not on medication begin Flonase and Xyzal.  No asbestos findings seen on CT chest May 2020.  Diffuse emphysematous and Bullae findings identified  No pulmonary nodules or lung mass identified on CT chest May 2020.         Follow up in about 8 weeks (around 9/18/2020) for COPD/emphysema w/review cpft.    Total time spent in face to face counseling and coordination of care - 30 minutes over 50% of time was used in discussion of prognosis, risks, benefits of treatment, instructions and compliance with regimen . Discussion with other physicians or health care providers (DME, NP, pharmacy, respiratory therapy) occurred.

## 2020-07-24 NOTE — PROCEDURES
"[facility]  Six Minute Walk     SUMMARY     Ordering Provider: Dr. Winslow   Interpreting Provider: Dr. Winslow  Performing nurse/tech/RT: CHRISTOPHER Duarte crt  Diagnosis: (asthma with copd, exercise hypoxemia, dyspnea on exertion)  Height: 5' 8" (172.7 cm)  Weight: 67.2 kg (148 lb 2.4 oz)  BMI (Calculated): 22.5   Patient Race:             Phase Oxygen Assessment Supplemental O2 Heart   Rate Blood Pressure Shin Dyspnea Scale Rating   Resting 96 % Room Air 73 bpm 145/67 7-8   Exercise        Minute        1 98 % Room Air 64 bpm     2 95 % Room Air 63 bpm     3 96 % Room Air 98 bpm     4 98 % Room Air 79 bpm     5 97 % Room Air 85 bpm     6  94 % Room Air 105 bpm (!) 131/95 7-8   Recovery        Minute        1 94 % Room Air 106 bpm     2 96 % Room Air 84 bpm     3 97 % Room Air 87 bpm     4 97 % Room Air 79 bpm 141/76 4     Six Minute Walk Summary  6MWT Status: completed with stops  Patient Reported: Dyspnea(hip pain)     Interpretation:  Did the patient stop or pause?: Yes  How many times did the patient stop or pause?: 1  Stop Time 1: 95  Restart Time 1: 260  Pause Time 1: 165 seconds                             Total Time Walked (Calculated): 195 seconds  Final Partial Lap Distance (feet): 0 feet  Total Distance Meters (Calculated): 243.84 meters  Predicted Distance Meters (Calculated): 528.67 meters  Percentage of Predicted (Calculated): 46.12  Peak VO2 (Calculated): 11.3  Mets: 3.23  Has The Patient Had a Previous Six Minute Walk Test?: No       Previous 6MWT Results  Has The Patient Had a Previous Six Minute Walk Test?: No     Interpretation:  Total distance walked in six minutes is moderately reduced indicating a reduction in overall functional capacity.   There was no exercise induced hypoxemia with significant oxygen desaturation.  Oxygen desaturation did not meet criteria for supplemental oxygen prescription.    Clinical correlation suggested.    Kuldeep Winslow MD    Mild exercise-induced " hypoxemia described as an arterial oxygen saturation of 93-95% (or 3-4% less than at rest), moderate exercise-induced hypoxemia as 89-93%, and severe exercise induced hypoxemia as < 89% O2 saturation.  Medicare Criteria Comments:   When arterial oxygen saturation is at or below 88% during exercise (severe exercise induced hypoxemia) then the patient falls under Medicare Group 1 criteria for supplemental oxygen.  Details about Medicare Group Criteria coverage can be found at http://www.cms.hhs.gov/manuals/downloads/

## 2020-07-24 NOTE — LETTER
July 24, 2020      Kuldeep Winslow MD  [address]  [place] [address]           [facility]  [address] [place] [id number]  Phone: [phone]  Fax: [phone]          Patient: Александр Roman   MR Number: [id number]   YOB: 1950   Date of Visit: 7/24/2020       Dear Dr. Kuldeep Winslow:    Thank you for referring Александр Roman to me for evaluation. Attached you will find relevant portions of my assessment and plan of care.    If you have questions, please do not hesitate to call me. I look forward to following Александр Roman along with you.    Sincerely,    Alyssa Best, NP    Enclosure  CC:  No Recipients    If you would like to receive this communication electronically, please contact [email or web address] or [phone] to request more information on [facility] Link access.    For providers and/or their staff who would like to refer a patient to [facility], please contact us through our one-stop-shop provider referral line, [facility] , at [phone].    If you feel you have received this communication in error or would no longer like to receive these types of communications, please e-mail [email or web address]

## 2020-07-24 NOTE — ASSESSMENT & PLAN NOTE
Suboptimal control on Advair patient not using  Begin trial of Anoro Laba/Lama  Consideration to add on Pulmicort nebs or ics controller depending on results of Anoro alone, Trelegy Ellipta may also be considered if covered by his people's Health insurance.   Continue albuterol inhaler and duo nebs if needed.  Follow-up for complete PFT  CT chest May 2020 revealed diffuse centrilobular emphysema and Bullae of lungs.  No pulmonary nodules. no lung mass. no asbestos findings.

## 2020-07-24 NOTE — ASSESSMENT & PLAN NOTE
CT chest May 2020 revealed diffuse centrilobular emphysema and Bullae of lungs.  No pulmonary nodules. no lung mass. no asbestos findings.

## 2020-09-11 DIAGNOSIS — J43.1 PANLOBULAR EMPHYSEMA: Primary | ICD-10-CM

## 2020-09-11 DIAGNOSIS — J43.2 CENTRILOBULAR EMPHYSEMA: ICD-10-CM

## 2020-09-20 ENCOUNTER — LAB VISIT (OUTPATIENT)
Dept: OTOLARYNGOLOGY | Facility: CLINIC | Age: 70
End: 2020-09-20
Payer: MEDICARE

## 2020-09-20 DIAGNOSIS — J43.2 CENTRILOBULAR EMPHYSEMA: ICD-10-CM

## 2020-09-20 PROCEDURE — U0003 INFECTIOUS AGENT DETECTION BY NUCLEIC ACID (DNA OR RNA); SEVERE ACUTE RESPIRATORY SYNDROME CORONAVIRUS 2 (SARS-COV-2) (CORONAVIRUS DISEASE [COVID-19]), AMPLIFIED PROBE TECHNIQUE, MAKING USE OF HIGH THROUGHPUT TECHNOLOGIES AS DESCRIBED BY CMS-2020-01-R: HCPCS

## 2020-09-21 LAB — SARS-COV-2 RNA RESP QL NAA+PROBE: NOT DETECTED
